# Patient Record
Sex: FEMALE | Race: WHITE
[De-identification: names, ages, dates, MRNs, and addresses within clinical notes are randomized per-mention and may not be internally consistent; named-entity substitution may affect disease eponyms.]

---

## 2017-06-03 ENCOUNTER — HOSPITAL ENCOUNTER (EMERGENCY)
Dept: HOSPITAL 31 - C.ER | Age: 57
Discharge: HOME | End: 2017-06-03
Payer: COMMERCIAL

## 2017-06-03 VITALS
HEART RATE: 76 BPM | TEMPERATURE: 97.8 F | RESPIRATION RATE: 20 BRPM | DIASTOLIC BLOOD PRESSURE: 78 MMHG | SYSTOLIC BLOOD PRESSURE: 116 MMHG | OXYGEN SATURATION: 96 %

## 2017-06-03 VITALS — BODY MASS INDEX: 35.4 KG/M2

## 2017-06-03 DIAGNOSIS — M54.5: Primary | ICD-10-CM

## 2017-06-03 NOTE — C.PDOC
History Of Present Illness


The patient, a 58 y/o female whose PMHx includes right-sided sciatica, presents 

to the ED for evaluation after a recent flare up of her chronic right-sided 

sciatic pain. Patient denies recent trauma/direct injuries to affected areas, 

urinary/bowel incontinence, upper/lower extremity numbness/weakness, and has no 

other physical complaints at this time. 


Chief Complaint (Nursing): Back Pain


History Per: Patient


History/Exam Limitations: no limitations


Onset/Duration Of Symptoms: Days


Current Symptoms Are (Timing): Still Present


Quality Of Discomfort: "Pain"


Previous Symptoms: Back Pain, Chronic Pain


Associated Symptoms: denies: Incontinence, New Weakness, New Numbness


Additional History Per: Patient





Past Medical History


Reviewed: Historical Data, Nursing Documentation, Vital Signs


Vital Signs: 


 Last Vital Signs











Temp  97.8 F   06/03/17 15:22


 


Pulse  76   06/03/17 15:22


 


Resp  20   06/03/17 15:22


 


BP  116/78   06/03/17 15:22


 


Pulse Ox  96   06/03/17 22:51














- Medical History


PMH: Asthma, Diabetes (type II), HTN


Surgical History: No Surg Hx





- [facility] Procedures








ESOPHAGOGASTRODUODENOSCOPY [EGD] W/CLOSED BIOPSY (04/02/15)








Family History: States: Diabetes, Hypertension





- Social History


Hx Alcohol Use: No


Hx Substance Use: No





Review Of Systems


Except As Marked, All Systems Reviewed And Found Negative.


Genitourinary: Negative for: Incontinence


Musculoskeletal: Positive for: Back Pain (+flare up of chronic right-sided 

sciatic pain )


Neurological: Negative for: Weakness, Numbness





Physical Exam





- Physical Exam


Appears: Non-toxic, No Acute Distress


Skin: Normal Color, Warm, Dry


Head: Atraumatic, Normacephalic


Eye(s): bilateral: Normal Inspection


Oral Mucosa: Moist


Neck: Normal ROM, Supple


Chest: Symmetrical, No Deformity, No Tenderness


Cardiovascular: Rhythm Regular, No Murmur


Respiratory: Normal Breath Sounds, No Rales, No Rhonchi, No Wheezing


Gastrointestinal/Abdominal: No Rebound


Back: Normal Inspection, No Vertebral Tenderness, No Paraspinal Tenderness


Extremity: Normal ROM, Capillary Refill (less than 2 seconds )


Pulses: Left Dorsalis Pedis: Normal, Right Dorsalis Pedis: Normal


Neurological/Psych: Oriented x3, Normal Speech, Normal Cognition, Normal 

Sensation


Gait: Steady





ED Course And Treatment


O2 Sat by Pulse Oximetry: 96 (on RA)


Pulse Ox Interpretation: Normal





Medical Decision Making


Medical Decision Making: 





Impression: 58 y/o female with right-sided sciatic pain 


Plan: 


* Motrin PO 


* Flexeril PO 


* reassess and disposition 





Progress Notes: 


Patient received Motrin PO and Flexeril PO. 


On reassessment, patient is resting comfortably, showing no signs of distress, 

and reports an improvement in her pain. Patient is ambulatory in the ED without 

distress and is stable for discharge. Patient is advised to follow up with her 

PMD within 2-3 days for further evaluation. 





Disposition





- Disposition


Referrals: 


[facility] Real Mcdermott, [Non-Staff] - 


Disposition: HOME/ ROUTINE


Disposition Time: 16:15


Condition: GOOD


Additional Instructions: 





Thank you for letting us take care of you today. Your provider was Dr. Goss. You were treated for chronic back pain. The emergency medical care 

you received today was directed at your acute symptoms. If you were prescribed 

any medication, please fill it and take as directed. It may take several days 

for your symptoms to resolve. Return to the Emergency Department if your 

symptoms worsen, do not improve, or if you have any other problems.





Please contact your doctor or call one of the physicians/clinics you have been 

referred to that are listed on the Patient Visit Information form that is 

included in your discharge packet. Bring any paperwork you were given at 

discharge with you along with any medications you are taking to your follow up 

visit. Our treatment cannot replace ongoing medical care by a primary care 

provider (PCP) outside of the emergency department.





Thank you for allowing the [facility] team to be part of your care today.














Follow up with your doctor in 2-3 days for re-evaluation.


Prescriptions: 


Cyclobenzaprine [Cyclobenzaprine HCl] 10 mg PO Q8 PRN #20 tab


 PRN Reason: Muscle Spasm


Ibuprofen [Motrin] 600 mg PO Q6 PRN #20 tab


 PRN Reason: Pain, Moderate (4-7)


Instructions:  Sciatica (ED), Chronic Back Pain (ED)


Forms:  Gen Discharge Inst Urdu, Work Excuse


Print Language: Slovak





- Clinical Impression


Clinical Impression: 


 Low back pain








- Scribe Statement


The provider has reviewed the documentation as recorded by the Scribe (Bree Clement)


Provider Attestation: 








All medical record entries made by the Scribe were at my direction and 

personally dictated by me. I have reviewed the chart and agree that the record 

accurately reflects my personal performance of the history, physical exam, 

medical decision making, and the department course for this patient. I have 

also personally directed, reviewed, and agree with the discharge instructions 

and disposition.

## 2018-01-19 ENCOUNTER — HOSPITAL ENCOUNTER (EMERGENCY)
Dept: HOSPITAL 31 - C.ER | Age: 58
Discharge: HOME | End: 2018-01-19
Payer: COMMERCIAL

## 2018-01-19 VITALS
DIASTOLIC BLOOD PRESSURE: 89 MMHG | SYSTOLIC BLOOD PRESSURE: 161 MMHG | TEMPERATURE: 98 F | HEART RATE: 76 BPM | OXYGEN SATURATION: 98 % | RESPIRATION RATE: 2 BRPM

## 2018-01-19 VITALS — BODY MASS INDEX: 35.4 KG/M2

## 2018-01-19 DIAGNOSIS — M54.31: Primary | ICD-10-CM

## 2018-01-19 PROCEDURE — 96372 THER/PROPH/DIAG INJ SC/IM: CPT

## 2018-01-19 PROCEDURE — 99283 EMERGENCY DEPT VISIT LOW MDM: CPT

## 2018-01-19 NOTE — C.PDOC
History Of Present Illness


57 year old female presents to the ER for for sciatica like pain in the lower 

right back which has been present for the past 4 days. Patient states that the 

pain radiates down her right leg. She thinks the pain might be related to her 

work. Patient reports that she took Tylenol without any relief. She denies 

having any injury,weakness, numbness, and incontinence. Patient states that she 

does have cough and congestion.


Time Seen by Provider: 01/19/18 12:07


Chief Complaint (Nursing): Back Pain


History Per: Patient


History/Exam Limitations: no limitations


Onset/Duration Of Symptoms: Days


Current Symptoms Are (Timing): Still Present


Severity: Moderate


Associated Symptoms: None





Past Medical History


Reviewed: Historical Data, Nursing Documentation, Vital Signs


Vital Signs: 


 Last Vital Signs











Temp  98.0 F   01/19/18 12:00


 


Pulse  76   01/19/18 12:00


 


Resp  2 L  01/19/18 12:00


 


BP  161/89 H  01/19/18 12:00


 


Pulse Ox  98   01/19/18 12:54














- Medical History


PMH: Asthma, Diabetes (type II), HTN, Hypercholesterolemia


   Denies: Chronic Kidney Disease


Surgical History: No Surg Hx





- [facility] Procedures








ESOPHAGOGASTRODUODENOSCOPY [EGD] W/CLOSED BIOPSY (04/02/15)








Family History: States: Diabetes, Hypertension





- Social History


Hx Alcohol Use: No


Hx Substance Use: No





- Immunization History


Hx Tetanus Toxoid Vaccination: No


Hx Influenza Vaccination: No


Hx Pneumococcal Vaccination: No





Review Of Systems


Except As Marked, All Systems Reviewed And Found Negative.


Constitutional: Negative for: Fever, Chills


ENT: Positive for: Nose Congestion


Respiratory: Positive for: Cough


Genitourinary: Negative for: Incontinence


Musculoskeletal: Positive for: Back Pain


Neurological: Negative for: Weakness, Numbness





Physical Exam





- Physical Exam


Appears: Non-toxic, No Acute Distress


Skin: Normal Color, Warm


Head: Atraumatic, Normacephalic


Eye(s): bilateral: Normal Inspection, PERRL


Ear(s): Bilateral: Normal


Nose: Discharge (nasal congestion)


Oral Mucosa: Moist


Throat: Normal, No Erythema, No Exudate


Neck: Supple


Chest: Symmetrical


Cardiovascular: Rhythm Regular


Respiratory: Normal Breath Sounds, No Accessory Muscle Use, No Rales, No Rhonchi

, No Wheezing


Gastrointestinal/Abdominal: Normal Exam, Soft, No Tenderness


Back: Normal Inspection, No CVA Tenderness, No Vertebral Tenderness, No 

Paraspinal Tenderness


Extremity: Normal ROM


Neurological/Psych: Oriented x3, Normal Speech, Normal Cognition, Normal Motor, 

Normal Sensation





ED Course And Treatment


O2 Sat by Pulse Oximetry: 98 (RA)


Pulse Ox Interpretation: Normal





Medical Decision Making


Medical Decision Making: 


Impression: Lower Back Pain





Plan:


-- Flexeril 10 mg PO


--Toradol 60 mg IM





RE-Eval:


1300 Patient is seated comfortably in bed and reports feeling better. Pain is 

improving. She is ambulatory without discomfort. She feels comfortable going 

home and will be discharge. Rx given. Recommend OTC decongestant and cough 

medicine.  Advise follow up with her PCP. 





Disposition


Counseled Patient/Family Regarding: Diagnosis, Need For Followup, Rx Given





- Disposition


Referrals: 


Brinda Oliveros MD [Medical Doctor] - 


Disposition: HOME/ ROUTINE


Disposition Time: 12:52


Condition: STABLE


Additional Instructions: 


[place] medicamentos para el dolor de espalda segn sea necesario


Guerita un seguimiento con conner mdico para recibir ms atencin


Prescriptions: 


Cyclobenzaprine [Cyclobenzaprine HCl] 10 mg PO TID #21 tab


Ibuprofen [Motrin] 600 mg PO Q8 #30 tab


Instructions:  Sciatica (ED)


Forms:  [employer] (Irish)


Print Language: Upper sorbian





- POA


Present On Arrival: None





- Clinical Impression


Clinical Impression: 


 Sciatica








- PA / NP / Resident Statement


MD/DO has reviewed & agrees with the documentation as recorded.





- Scribe Statement


The provider has reviewed the documentation as recorded by the Cyndi Jordan





Provider Attestation





All medical record entries made by the Monsteribshira were at my direction and 

personally dictated by me. I have reviewed the chart and agree that the record 

accurately reflects my personal performance of the history, physical exam, 

medical decision making, and the department course for this patient. I have 

also personally directed, reviewed, and agree with the discharge instructions 

and disposition.

## 2018-02-10 ENCOUNTER — HOSPITAL ENCOUNTER (EMERGENCY)
Dept: HOSPITAL 31 - C.ER | Age: 58
Discharge: HOME | End: 2018-02-10
Payer: COMMERCIAL

## 2018-02-10 VITALS — HEART RATE: 84 BPM | SYSTOLIC BLOOD PRESSURE: 112 MMHG | RESPIRATION RATE: 16 BRPM | DIASTOLIC BLOOD PRESSURE: 75 MMHG

## 2018-02-10 VITALS — OXYGEN SATURATION: 99 %

## 2018-02-10 VITALS — BODY MASS INDEX: 35.4 KG/M2

## 2018-02-10 VITALS — TEMPERATURE: 98 F

## 2018-02-10 DIAGNOSIS — M54.41: Primary | ICD-10-CM

## 2018-02-10 PROCEDURE — 99283 EMERGENCY DEPT VISIT LOW MDM: CPT

## 2018-02-10 PROCEDURE — 96372 THER/PROPH/DIAG INJ SC/IM: CPT

## 2018-02-10 NOTE — C.PDOC
History Of Present Illness


58 yr old female with history of sciatica, presents to the ER for back pain, 

radiating to the right leg. Patient states she took Ibuprofen with minimal 

relief. She reports this is typical of her pain. She was seen in ED last month 

and got a shot that helped. Denies abdominal pain, dysuria, incontinence, 

weakness or numbness. 


Time Seen by Provider: 02/10/18 13:13


Chief Complaint (Nursing): Back Pain


History Per: Patient


History/Exam Limitations: no limitations


Onset/Duration Of Symptoms: Days





Past Medical History


Reviewed: Historical Data, Nursing Documentation, Vital Signs


Vital Signs: 


 Last Vital Signs











Temp  98 F   02/10/18 12:59


 


Pulse  84   02/10/18 14:19


 


Resp  16   02/10/18 14:19


 


BP  112/75   02/10/18 14:19


 


Pulse Ox  98   02/10/18 14:19














- Medical History


PMH: Asthma, Diabetes (type II), HTN, Hypercholesterolemia





- [facility] Procedures








ESOPHAGOGASTRODUODENOSCOPY [EGD] W/CLOSED BIOPSY (04/02/15)








Family History: States: Diabetes, Hypertension





- Social History


Hx Alcohol Use: No


Hx Substance Use: No





- Immunization History


Hx Tetanus Toxoid Vaccination: No


Hx Influenza Vaccination: No


Hx Pneumococcal Vaccination: No





Review Of Systems


Except As Marked, All Systems Reviewed And Found Negative.


Gastrointestinal: Negative for: Abdominal Pain


Genitourinary: Negative for: Dysuria, Incontinence


Musculoskeletal: Positive for: Back Pain


Neurological: Negative for: Weakness, Numbness





Physical Exam





- Physical Exam


Appears: Non-toxic, No Acute Distress


Skin: Warm, Dry, No Rash


Head: Atraumatic, Normacephalic


Eye(s): bilateral: Normal Inspection, EOMI


Nose: Normal


Oral Mucosa: Moist


Neck: Normal ROM


Chest: Symmetrical


Cardiovascular: Rhythm Regular, No Murmur


Respiratory: Normal Breath Sounds, No Rales, No Rhonchi, No Stridor, No Wheezing


Gastrointestinal/Abdominal: Bowel Sounds, No Tenderness


Back: Normal Inspection (no swelling, bulging or rash), No Vertebral Tenderness

, No Decreased ROM, Other ((+) mild lower back tenderness)


Extremity: Normal ROM, No Swelling


Neurological/Psych: Oriented x3, Normal Speech


Gait: Steady





ED Course And Treatment


O2 Sat by Pulse Oximetry: 99 (RA)


Pulse Ox Interpretation: Normal





Medical Decision Making


Medical Decision Making: 


PLAN:


* Valium Po


* Toradol IM 





Re-Eval:


Patient reprots pain is improving, feels better than when she arrived to ED. 

She is ambulatory without signs of discomfort. She is stable for discharge and 

Rx given. 





Disposition


Counseled Patient/Family Regarding: Need For Followup, Rx Given





- Disposition


Disposition: HOME/ ROUTINE


Disposition Time: 14:12


Condition: IMPROVED


Additional Instructions: 





Vaya a conner mdico o la clnica en 2-5 lyons sin falta, para mas evaluacin. [place] 

los medicamentos michael indicado. Volver a la aye de emergencia en cualquier 

momento si los sntomas persisten o empeoran.





Prescriptions: 


Cyclobenzaprine [Cyclobenzaprine HCl] 10 mg PO TID #30 tab


Ibuprofen [Motrin] 600 mg PO Q8 #30 tab


Instructions:  Sciatica (ED)


Forms:  [facility] (English)


Print Language: Gambian





- POA


Present On Arrival: None





- Clinical Impression


Clinical Impression: 


 Low back pain, Sciatica








- PA / NP / Resident Statement


MD/DO has reviewed & agrees with the documentation as recorded.





- Scribe Statement


The provider has reviewed the documentation as recorded by the Scribe


Kelly Dorsey





All medical record entries made by the Scribe were at my direction and 

personally dictated by me. I have reviewed the chart and agree that the record 

accurately reflects my personal performance of the history, physical exam, 

medical decision making, and the department course for this patient. I have 

also personally directed, reviewed, and agree with the discharge instructions 

and disposition.